# Patient Record
Sex: MALE | Race: AMERICAN INDIAN OR ALASKA NATIVE
[De-identification: names, ages, dates, MRNs, and addresses within clinical notes are randomized per-mention and may not be internally consistent; named-entity substitution may affect disease eponyms.]

---

## 2019-03-12 ENCOUNTER — HOSPITAL ENCOUNTER (EMERGENCY)
Dept: HOSPITAL 5 - ED | Age: 54
Discharge: HOME | End: 2019-03-12
Payer: COMMERCIAL

## 2019-03-12 VITALS — SYSTOLIC BLOOD PRESSURE: 184 MMHG | DIASTOLIC BLOOD PRESSURE: 92 MMHG

## 2019-03-12 DIAGNOSIS — J45.909: ICD-10-CM

## 2019-03-12 DIAGNOSIS — Z91.19: ICD-10-CM

## 2019-03-12 DIAGNOSIS — I10: Primary | ICD-10-CM

## 2019-03-12 DIAGNOSIS — F17.200: ICD-10-CM

## 2019-03-12 PROCEDURE — 99282 EMERGENCY DEPT VISIT SF MDM: CPT

## 2019-03-12 NOTE — EMERGENCY DEPARTMENT REPORT
ED General Adult HPI





- General


Chief complaint: High BP


Stated complaint: POSS HIGH BP


Time Seen by Provider: 03/12/19 05:36


Source: patient, old records reviewed


Mode of arrival: Ambulatory


Limitations: No Limitations





- History of Present Illness


Initial comments: 





53-year-old -American male with a past medical history of attention 

asthma and a TIA back in 02/17/2017.  Patient reports that he checked his blood 

pressure at home and it was 170 at home.  Patient admits to a headache that is 

dull.  Patient reports that he was prescribed lisinopril 2 years ago has been 

out of his meds never followed up.  Patient reports he does not have a primary 

care provider.  Patient denies any chest pain or shortness of breathing.


-: days(s) (1)


Severity scale (0 -10): 10


Quality: aching, dull


Consistency: constant


Improves with: none


Treatments Prior to Arrival: none





- Related Data


                                  Previous Rx's











 Medication  Instructions  Recorded  Last Taken  Type


 


Albuterol  *Only Ed* [Proventil 2.5 mg IH Q4H PRN #30 vial 01/19/16 Unknown Rx





0.5% NEBS]    


 


Lisinopril [Zestril TAB] 20 mg PO QDAY #30 tablet 01/19/16 Unknown Rx


 


Pantoprazole [Protonix] 40 mg PO QDAY #30 tablet 01/19/16 Unknown Rx


 


Albuterol Sulfate [Albuterol 0.63% 0.63 mg IH TID PRN #1 box 02/17/17 Unknown Rx





NEBS]    


 


Aspirin [Aspirin TAB] 325 mg PO QDAY #30 tablet 02/17/17 Unknown Rx


 


Ipratropium [Atrovent NEB] 0.5 mg IH Q6HRT #1 box 02/17/17 Unknown Rx


 


Simvastatin [Zocor TAB] 5 mg PO QHS #30 tablet 02/17/17 Unknown Rx


 


amLODIPine [Norvasc] 10 mg PO DAILY #30 tab 03/12/19 Unknown Rx











                                    Allergies











Allergy/AdvReac Type Severity Reaction Status Date / Time


 


No Known Allergies Allergy   Verified 10/17/15 22:52














ED Review of Systems


ROS: 


Stated complaint: POSS HIGH BP


Other details as noted in HPI





Comment: All other systems reviewed and negative


Respiratory: wheezing


Neurological: headache





ED Past Medical Hx





- Past Medical History


Previous Medical History?: Yes


Hx Hypertension: Yes


Hx Asthma: Yes


Additional medical history: Ulcers





- Surgical History


Past Surgical History?: Yes


Additional Surgical History: RIGHT EYE SURGERY





- Social History


Smoking Status: Current Every Day Smoker


Substance Use Type: Alcohol





- Medications


Home Medications: 


                                Home Medications











 Medication  Instructions  Recorded  Confirmed  Last Taken  Type


 


Albuterol  *Only Ed* [Proventil 2.5 mg IH Q4H PRN #30 vial 01/19/16  Unknown Rx





0.5% NEBS]     


 


Lisinopril [Zestril TAB] 20 mg PO QDAY #30 tablet 01/19/16  Unknown Rx


 


Pantoprazole [Protonix] 40 mg PO QDAY #30 tablet 01/19/16  Unknown Rx


 


Albuterol Sulfate [Albuterol 0.63% 0.63 mg IH TID PRN #1 box 02/17/17  Unknown 

Rx





NEBS]     


 


Aspirin [Aspirin TAB] 325 mg PO QDAY #30 tablet 02/17/17  Unknown Rx


 


Ipratropium [Atrovent NEB] 0.5 mg IH Q6HRT #1 box 02/17/17  Unknown Rx


 


Simvastatin [Zocor TAB] 5 mg PO QHS #30 tablet 02/17/17  Unknown Rx


 


amLODIPine [Norvasc] 10 mg PO DAILY #30 tab 03/12/19  Unknown Rx














ED Physical Exam





- General


Limitations: No Limitations


General appearance: alert





- Head


Head exam: Present: atraumatic, normocephalic





- Eye


Eye exam: Present: EOMI





- ENT


ENT exam: Present: mucous membranes moist





- Neck


Neck exam: Present: normal inspection, full ROM





- Respiratory


Respiratory exam: Present: wheezes





- Cardiovascular


Cardiovascular Exam: Present: regular rate, normal rhythm.  Absent: systolic 

murmur, diastolic murmur, rubs, gallop





- GI/Abdominal


GI/Abdominal exam: Present: soft, normal bowel sounds





- Neurological Exam


Neurological exam: Present: alert, oriented X3, normal gait





- Expanded Neurological Exam


  ** Expanded


Cranial nerves: EOM's Intact: Normal, Gag Reflex: Normal, Tongue Deviation: 

Normal, Nystagmus: Normal, Facial Sensation: Normal, Facial Palsy with Forehead 

Movement: Normal, Facial Palsy without Forehead Movement: Normal


Cerebellar function: Finger to Nose: Normal, Heel to Shin: Normal


Upper motor neuron: Krishan Neglect: Normal, Pronator Drift: Normal


Motor strength exam: RUE: 4, LUE: 4, RLE: 4, LLE: 4


Best Eye Response (Alexandr): (4) open spontaneously


Best Motor Response ([place]): (6) obeys commands


Best Verbal Response (Alexandr): (5) oriented


Alexandr Total: 15





- Psychiatric


Psychiatric exam: Present: normal affect, normal mood





- Skin


Skin exam: Present: warm, dry, intact, normal color.  Absent: rash





ED Course


                                   Vital Signs











  03/12/19





  04:51


 


Temperature 97.7 F


 


Pulse Rate 62


 


Respiratory 18





Rate 


 


Blood Pressure 199/100


 


O2 Sat by Pulse 100





Oximetry 














ED Medical Decision Making





- Medical Decision Making





Patient has been evaluated by this provider at [facility].


Patient was given clonidine 0.1 mg for blood pressure of 199/100.


Repeat of blood pressure after having clonidine for 40 minutes is 184/92 heart 

rate 54.


It was noted on examination the patient has some wheezing.  Patient declined to 

have a breathing treatment states that he will get one at home.  Patient reports

 that many people at his home has asthma and they have medication that he can 

use.


Patient will be discharged home on amlodipine 10 mg daily discussed with patient

 that this prescription is free at [facility] patient will be given a good Rx cart.


Discussed with patient to follow up at [facility] to have his 

blood pressure rechecked.  Patient verbalized understanding


Critical care attestation.: 


If time is entered above; I have spent that time in minutes in the direct care 

of this critically ill patient, excluding procedure time.








ED Disposition


Clinical Impression: 


 Non-compliance, Wheezing





HTN (hypertension)


Qualifiers:


 Hypertension type: unspecified Qualified Code(s): I10 - Essential (primary) 

hypertension





Disposition: DC-01 TO HOME OR SELFCARE


Is pt being admited?: No


Does the pt Need Aspirin: No


Condition: Stable


Instructions:  Hypertension (ED)


Additional Instructions: 


Please take your blood pressure medication daily as prescribed.  I have referred

 to [facility] clinic to follow up for your blood pressure management.  

Their information is listed below for your convenience.  Be aware that you're 

blood pressure medication I have prescribed to you is free at [facility].  I have 

also given you a good [employer] discount insurance card to help with your 

prescription refill.


Prescriptions: 


amLODIPine [Norvasc] 10 mg PO DAILY #30 tab


Referrals: 


[facility] [place] [Primary Care Provider] - 3-5 Days

## 2019-06-13 ENCOUNTER — HOSPITAL ENCOUNTER (EMERGENCY)
Dept: HOSPITAL 5 - ED | Age: 54
Discharge: HOME | End: 2019-06-13
Payer: SELF-PAY

## 2019-06-13 VITALS — SYSTOLIC BLOOD PRESSURE: 171 MMHG | DIASTOLIC BLOOD PRESSURE: 105 MMHG

## 2019-06-13 DIAGNOSIS — Z79.899: ICD-10-CM

## 2019-06-13 DIAGNOSIS — Z79.82: ICD-10-CM

## 2019-06-13 DIAGNOSIS — I10: ICD-10-CM

## 2019-06-13 DIAGNOSIS — J45.909: ICD-10-CM

## 2019-06-13 DIAGNOSIS — M25.562: Primary | ICD-10-CM

## 2019-06-13 DIAGNOSIS — F17.200: ICD-10-CM

## 2019-06-13 LAB
ALBUMIN SERPL-MCNC: 4.3 G/DL (ref 3.9–5)
ALT SERPL-CCNC: 11 UNITS/L (ref 7–56)
BASOPHILS # (AUTO): 0.1 K/MM3 (ref 0–0.1)
BASOPHILS NFR BLD AUTO: 0.9 % (ref 0–1.8)
BUN SERPL-MCNC: 20 MG/DL (ref 9–20)
BUN/CREAT SERPL: 14 %
CALCIUM SERPL-MCNC: 9.1 MG/DL (ref 8.4–10.2)
EOSINOPHIL # BLD AUTO: 0.3 K/MM3 (ref 0–0.4)
EOSINOPHIL NFR BLD AUTO: 5.3 % (ref 0–4.3)
HCT VFR BLD CALC: 41 % (ref 35.5–45.6)
HEMOLYSIS INDEX: 4
HGB BLD-MCNC: 14 GM/DL (ref 11.8–15.2)
LYMPHOCYTES # BLD AUTO: 2.4 K/MM3 (ref 1.2–5.4)
LYMPHOCYTES NFR BLD AUTO: 36.5 % (ref 13.4–35)
MCHC RBC AUTO-ENTMCNC: 34 % (ref 32–34)
MCV RBC AUTO: 90 FL (ref 84–94)
MONOCYTES # (AUTO): 0.5 K/MM3 (ref 0–0.8)
MONOCYTES % (AUTO): 7.8 % (ref 0–7.3)
PLATELET # BLD: 221 K/MM3 (ref 140–440)
RBC # BLD AUTO: 4.56 M/MM3 (ref 3.65–5.03)

## 2019-06-13 PROCEDURE — 96372 THER/PROPH/DIAG INJ SC/IM: CPT

## 2019-06-13 PROCEDURE — 80053 COMPREHEN METABOLIC PANEL: CPT

## 2019-06-13 PROCEDURE — 36415 COLL VENOUS BLD VENIPUNCTURE: CPT

## 2019-06-13 PROCEDURE — 73562 X-RAY EXAM OF KNEE 3: CPT

## 2019-06-13 PROCEDURE — 85025 COMPLETE CBC W/AUTO DIFF WBC: CPT

## 2019-06-13 PROCEDURE — 99284 EMERGENCY DEPT VISIT MOD MDM: CPT

## 2019-06-13 NOTE — EMERGENCY DEPARTMENT REPORT
ED Extremity Problem HPI





- General


Chief complaint: Extremity Injury, Lower


Stated complaint: L KNEE PAIN


Time Seen by Provider: 06/13/19 18:32


Source: patient


Mode of arrival: Ambulatory


Limitations: No Limitations





- History of Present Illness


Initial comments: 





Patient is a 54-year-old male who presents to ED complaining of left knee pain 

2 days.  Patient states he has a history of arthritis and has been doing a lot 

of walking.  Patient states that pain is localized to his anterior and laterally

he.  Patient denies any swelling, injury, falls or trauma to the knee.


MD Complaint: extremity pain, joint paint


Severity scale (0 -10): 6





- Related Data


                                  Previous Rx's











 Medication  Instructions  Recorded  Last Taken  Type


 


Albuterol  *Only Ed* [Proventil 2.5 mg IH Q4H PRN #30 vial 01/19/16 Unknown Rx





0.5% NEBS]    


 


Lisinopril [Zestril TAB] 20 mg PO QDAY #30 tablet 01/19/16 Unknown Rx


 


Pantoprazole [Protonix] 40 mg PO QDAY #30 tablet 01/19/16 Unknown Rx


 


Albuterol Sulfate [Albuterol 0.63% 0.63 mg IH TID PRN #1 box 02/17/17 Unknown Rx





NEBS]    


 


Aspirin 325 mg PO QDAY #30 tablet 02/17/17 Unknown Rx


 


Ipratropium [Atrovent NEB] 0.5 mg IH Q6HRT #1 box 02/17/17 Unknown Rx


 


Simvastatin [Zocor TAB] 5 mg PO QHS #30 tablet 02/17/17 Unknown Rx


 


Cyclobenzaprine [Flexeril] 10 mg PO QHS PRN #10 tablet 06/13/19 Unknown Rx


 


Naproxen [Naprosyn] 500 mg PO BID #30 tablet 06/13/19 Unknown Rx


 


amLODIPine [Norvasc] 10 mg PO DAILY #30 tab 06/13/19 Unknown Rx











                                    Allergies











Allergy/AdvReac Type Severity Reaction Status Date / Time


 


No Known Allergies Allergy   Verified 06/13/19 18:26














ED Review of Systems


ROS: 


Stated complaint: L KNEE PAIN


Other details as noted in HPI





Comment: All other systems reviewed and negative





ED Past Medical Hx





- Past Medical History


Previous Medical History?: Yes


Hx Hypertension: Yes


Hx Asthma: Yes


Additional medical history: Ulcers





- Surgical History


Past Surgical History?: Yes


Additional Surgical History: RIGHT EYE SURGERY





- Social History


Smoking Status: Current Every Day Smoker


Substance Use Type: None





- Medications


Home Medications: 


                                Home Medications











 Medication  Instructions  Recorded  Confirmed  Last Taken  Type


 


Albuterol  *Only Ed* [Proventil 2.5 mg IH Q4H PRN #30 vial 01/19/16  Unknown Rx





0.5% NEBS]     


 


Lisinopril [Zestril TAB] 20 mg PO QDAY #30 tablet 01/19/16  Unknown Rx


 


Pantoprazole [Protonix] 40 mg PO QDAY #30 tablet 01/19/16  Unknown Rx


 


Albuterol Sulfate [Albuterol 0.63% 0.63 mg IH TID PRN #1 box 02/17/17  Unknown 

Rx





NEBS]     


 


Aspirin 325 mg PO QDAY #30 tablet 02/17/17  Unknown Rx


 


Ipratropium [Atrovent NEB] 0.5 mg IH Q6HRT #1 box 02/17/17  Unknown Rx


 


Simvastatin [Zocor TAB] 5 mg PO QHS #30 tablet 02/17/17  Unknown Rx


 


Cyclobenzaprine [Flexeril] 10 mg PO QHS PRN #10 tablet 06/13/19  Unknown Rx


 


Naproxen [Naprosyn] 500 mg PO BID #30 tablet 06/13/19  Unknown Rx


 


amLODIPine [Norvasc] 10 mg PO DAILY #30 tab 06/13/19  Unknown Rx














ED Physical Exam





- General


Limitations: No Limitations


General appearance: alert, in no apparent distress





- Head


Head exam: Present: atraumatic, normocephalic





- Eye


Eye exam: Present: normal appearance





- ENT


ENT exam: Present: mucous membranes moist





- Neck


Neck exam: Present: normal inspection





- Respiratory


Respiratory exam: Present: normal lung sounds bilaterally.  Absent: respiratory 

distress





- Cardiovascular


Cardiovascular Exam: Present: regular rate, normal rhythm.  Absent: systolic 

murmur, diastolic murmur, rubs, gallop





- GI/Abdominal


GI/Abdominal exam: Present: soft, normal bowel sounds





- Rectal


Rectal exam: Present: deferred





- Extremities Exam


Extremities exam: Present: normal inspection, full ROM.  Absent: joint swelling,

 calf tenderness





- Expanded Lower Extremity Exam


  ** Left


Hip exam: Present: normal inspection, full ROM


Upper Leg exam: Present: normal inspection, full ROM


Knee exam: Present: normal inspection, full ROM.  Absent: tenderness, swelling, 

abrasion, deformity, dislocation, erythema, effusion


Lower Leg exam: Present: normal inspection


Ankle exam: Present: normal inspection


Foot/Toe exam: Present: normal inspection, full ROM


Neuro vascular tendon exam: Present: no vascular compromise


Gait: Positive: observed and normal





- Back Exam


Back exam: Present: normal inspection, full ROM.  Absent: tenderness





- Neurological Exam


Neurological exam: Present: alert, oriented X3





- Psychiatric


Psychiatric exam: Present: normal affect, normal mood





- Skin


Skin exam: Present: warm, dry, intact, normal color.  Absent: rash





ED Course


                                   Vital Signs











  06/13/19 06/13/19 06/13/19





  18:34 20:46 22:36


 


Temperature 98.3 F  97.9 F


 


Pulse Rate 72  67


 


Respiratory 18  16





Rate   


 


Blood Pressure 208/115 226/106 


 


Blood Pressure   178/108





[Left]   


 


O2 Sat by Pulse 99  100





Oximetry   














  06/13/19





  23:35


 


Temperature 


 


Pulse Rate 57 L


 


Respiratory 17





Rate 


 


Blood Pressure 


 


Blood Pressure 171/105





[Left] 


 


O2 Sat by Pulse 99





Oximetry 














ED Medical Decision Making





- Lab Data


Result diagrams: 


                                 06/13/19 20:06





                                 06/13/19 20:06





- Radiology Data


Radiology results: report reviewed, image reviewed


Fluoro Time In Minutes: 





PROCEDURE: XR KNEE 3V LT 





TECHNIQUE: Left knee, 3 views 





HISTORY: knee pain 





COMPARISONS: None 





FINDINGS: 





No acute fracture or dislocation is seen. There are tricompartmental 

osteoarthritic changes, with 


joint space narrowing and osteophyte formation. There is an intra-articular body

 seen in the 


posterior medial joint space, measuring up to 17 mm. 2 smaller anterior intra-

articular body is 


seen, measuring 8 mm and 4 mm. 





IMPRESSION: 





No fracture or dislocation. Osteoarthritis. 





This document is electronically signed by Magali Torre MD., June 13 2019 

11:06:34 PM ET 





Transcribed By: [facility] 


Dictated By: MAGALI TORRE M.D. 


Electronically Authenticated By: MAGALI TORRE M.D. 


Signed Date/Time: 06/13/19 2157 











- Medical Decision Making


This 54-year-old male presents to the ED complaining of left knee joint pain.


Patient seen prednisone Toradol and ED.


Discussed with the patient to take medication as prescribed.


X-ray of the knee shows no acute deformity, see report above


Discussed findings with the patient.


Discussed follow-up with primary care physician.  Patient is able to ambulate 

without any problems.


Clonidine given in ED to reduce blood pressure.  Discussed follow-up with 

primary care physician for blood pressure management.  


Amlodipine given at discharge.





Critical care attestation.: 


If time is entered above; I have spent that time in minutes in the direct care 

of this critically ill patient, excluding procedure time.








ED Disposition


Clinical Impression: 


 Arthralgia of left knee, Knee pain, left, Uncontrolled hypertension





Disposition: DC-01 TO HOME OR SELFCARE


Is pt being admited?: No


Does the pt Need Aspirin: No


Condition: Stable


Instructions:  Osteoarthritis (ED), Hypertension (ED), Arthralgia (ED)


Additional Instructions: 


Make sure to follow up with the primary care physician as discussed.


Take all your medications as you've been prescribed.


If you have any worsening symptoms or develop new symptoms please return to ED 

immediately.


Prescriptions: 


Cyclobenzaprine [Flexeril] 10 mg PO QHS PRN #10 tablet


 PRN Reason: Muscle Spasm


Naproxen [Naprosyn] 500 mg PO BID #30 tablet


amLODIPine [Norvasc] 10 mg PO DAILY #30 tab


Referrals: 


[facility] [Primary Care Provider] - 3-5 Days


[facility] [Outside] - 3-5 Days


[facility] [Outside] - 3-5 Days


Forms:  Work/School Release Form(ED)


Time of Disposition: 22:45

## 2019-06-13 NOTE — XRAY REPORT
PROCEDURE: XR KNEE 3V LT 

 

TECHNIQUE:  Left knee, 3 views 

 

HISTORY: knee pain 

 

COMPARISONS: None  

 

FINDINGS: 

 

No acute fracture or dislocation is seen. There are tricompartmental osteoarthritic changes, with gabby
nt space narrowing and osteophyte formation. There is an intra-articular body seen in the posterior m
edial joint space, measuring up to 17 mm. 2 smaller anterior intra-articular body is seen, measuring 
8 mm and 4 mm. 

 

IMPRESSION:  

 

No fracture or dislocation. Osteoarthritis. 

 

This document is electronically signed by Magali Torre MD., June 13 2019 11:06:34 PM ET

## 2019-11-10 ENCOUNTER — HOSPITAL ENCOUNTER (EMERGENCY)
Dept: HOSPITAL 5 - ED | Age: 54
Discharge: HOME | End: 2019-11-10
Payer: SELF-PAY

## 2019-11-10 VITALS — DIASTOLIC BLOOD PRESSURE: 99 MMHG | SYSTOLIC BLOOD PRESSURE: 152 MMHG

## 2019-11-10 DIAGNOSIS — J45.909: ICD-10-CM
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
## 2019-11-10 DIAGNOSIS — S39.012A: Primary | ICD-10-CM
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
## 2019-11-10 DIAGNOSIS — I10: ICD-10-CM

## 2019-11-10 DIAGNOSIS — Z79.899: ICD-10-CM

## 2019-11-10 DIAGNOSIS — G89.29: ICD-10-CM
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
## 2019-11-10 DIAGNOSIS — Z79.82: ICD-10-CM
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
## 2019-11-10 PROCEDURE — 99282 EMERGENCY DEPT VISIT SF MDM: CPT

## 2019-11-10 PROCEDURE — 96372 THER/PROPH/DIAG INJ SC/IM: CPT

## 2019-11-10 NOTE — EMERGENCY DEPARTMENT REPORT
ED Back Pain/Injury HPI





- General


Chief Complaint: Back Pain/Injury


Stated Complaint: BACK PAIN


Time Seen by Provider: 11/10/19 11:27


Source: patient


Limitations: No Limitations





- History of Present Illness


Initial Comments: 





This is a 54-year-old male nontoxic, well nourished in appearance, no acute 

signs of distress presents to the ED with c/o of acute on chronic lower back pa

in.  Patient stated that the past 4 days he wake up and developed sharp pains.  

Patient denies any radiation of pain.  Patient denies any trauma.  Denies any 

bladder or bowel instability.  Patient denies any urinary symptoms.  Denies any 

fever, chills, nausea, vomiting, headache, stiff neck, chest pain or shortness 

of breath.  Patient denies any numbness or tingling.  Denies any allergies.  


MD Complaint: back pain


-: days(s) (4)


Similar Symptoms Previously: Yes


Place: home


Radiation: none


Severity: mild


Severity scale (0 -10): 8


Quality: aching


Consistency: intermittent


Improves With: immobilization, sitting upright


Worsens With: movement, walking


Associated Symptoms: denies other symptoms.  denies: confusion, weakness, chest 

pain, numbness, difficulty walking, cough, difficulty urinating, diaphoresis, 

incontinence, fever/chills, constipation, headaches, abdominal pain, loss of 

appetite, malaise, nausea/vomiting, rash, seizure, shortness of breath, syncope





- Related Data


                                  Previous Rx's











 Medication  Instructions  Recorded  Last Taken  Type


 


Albuterol  *Only Ed* [Proventil 2.5 mg IH Q4H PRN #30 vial 01/19/16 Unknown Rx





0.5% NEBS]    


 


Lisinopril [Zestril TAB] 20 mg PO QDAY #30 tablet 01/19/16 Unknown Rx


 


Pantoprazole [Protonix] 40 mg PO QDAY #30 tablet 01/19/16 Unknown Rx


 


Albuterol Sulfate [Albuterol 0.63% 0.63 mg IH TID PRN #1 box 02/17/17 Unknown Rx





NEBS]    


 


Aspirin 325 mg PO QDAY #30 tablet 02/17/17 Unknown Rx


 


Ipratropium [Atrovent NEB] 0.5 mg IH Q6HRT #1 box 02/17/17 Unknown Rx


 


Simvastatin [Zocor TAB] 5 mg PO QHS #30 tablet 02/17/17 Unknown Rx


 


Cyclobenzaprine [Flexeril] 10 mg PO QHS PRN #10 tablet 06/13/19 Unknown Rx


 


Naproxen [Naprosyn] 500 mg PO BID #30 tablet 06/13/19 Unknown Rx


 


amLODIPine 10 mg PO DAILY #30 tab 06/13/19 Unknown Rx


 


Cyclobenzaprine [Flexeril] 10 mg PO QHS PRN #10 tablet 11/10/19 Unknown Rx


 


Naproxen 500 mg PO Q12H PRN #10 tablet 11/10/19 Unknown Rx











                                    Allergies











Allergy/AdvReac Type Severity Reaction Status Date / Time


 


No Known Allergies Allergy   Verified 06/13/19 18:26














ED Review of Systems


ROS: 


Stated complaint: BACK PAIN


Other details as noted in HPI





Constitutional: denies: chills, fever


Eyes: denies: eye pain, eye discharge, vision change


ENT: denies: ear pain, throat pain


Respiratory: denies: cough, shortness of breath, wheezing


Cardiovascular: denies: chest pain, palpitations


Endocrine: no symptoms reported


Gastrointestinal: denies: abdominal pain, nausea, diarrhea


Genitourinary: denies: urgency, dysuria


Musculoskeletal: back pain.  denies: joint swelling, arthralgia


Skin: denies: rash, lesions


Neurological: denies: headache, weakness, paresthesias


Psychiatric: denies: anxiety, depression


Hematological/Lymphatic: denies: easy bleeding, easy bruising





ED Past Medical Hx





- Past Medical History


Hx Hypertension: Yes


Hx Asthma: Yes


Additional medical history: Ulcers





- Surgical History


Additional Surgical History: RIGHT EYE SURGERY





- Social History


Smoking Status: Never Smoker


Substance Use Type: None





- Medications


Home Medications: 


                                Home Medications











 Medication  Instructions  Recorded  Confirmed  Last Taken  Type


 


Albuterol  *Only Ed* [Proventil 2.5 mg IH Q4H PRN #30 vial 01/19/16  Unknown Rx





0.5% NEBS]     


 


Lisinopril [Zestril TAB] 20 mg PO QDAY #30 tablet 01/19/16  Unknown Rx


 


Pantoprazole [Protonix] 40 mg PO QDAY #30 tablet 01/19/16  Unknown Rx


 


Albuterol Sulfate [Albuterol 0.63% 0.63 mg IH TID PRN #1 box 02/17/17  Unknown 

Rx





NEBS]     


 


Aspirin 325 mg PO QDAY #30 tablet 02/17/17  Unknown Rx


 


Ipratropium [Atrovent NEB] 0.5 mg IH Q6HRT #1 box 02/17/17  Unknown Rx


 


Simvastatin [Zocor TAB] 5 mg PO QHS #30 tablet 02/17/17  Unknown Rx


 


Cyclobenzaprine [Flexeril] 10 mg PO QHS PRN #10 tablet 06/13/19  Unknown Rx


 


Naproxen [Naprosyn] 500 mg PO BID #30 tablet 06/13/19  Unknown Rx


 


amLODIPine 10 mg PO DAILY #30 tab 06/13/19  Unknown Rx


 


Cyclobenzaprine [Flexeril] 10 mg PO QHS PRN #10 tablet 11/10/19  Unknown Rx


 


Naproxen 500 mg PO Q12H PRN #10 tablet 11/10/19  Unknown Rx














ED Physical Exam





- General


Limitations: No Limitations


General appearance: alert, in no apparent distress





- Head


Head exam: Present: atraumatic, normocephalic





- Neck


Neck exam: Present: normal inspection, full ROM.  Absent: tenderness, 

meningismus, lymphadenopathy





- Extremities Exam


Extremities exam: Present: normal inspection, full ROM





- Back Exam


Back exam: Present: normal inspection, full ROM, paraspinal tenderness (lumbar 

paraspinal).  Absent: tenderness, CVA tenderness (R), CVA tenderness (L), muscle

 spasm, vertebral tenderness, rash noted





- Expanded Back Exam


  ** Expanded


Back exam: Absent: saddle anesthesia


Back exam: Negative Straight Leg Raising: Left, Right





- Neurological Exam


Neurological exam: Present: alert, oriented X3, normal gait





- Psychiatric


Psychiatric exam: Present: normal affect, normal mood





- Skin


Skin exam: Present: warm, dry, intact, normal color.  Absent: rash





ED Course





                                   Vital Signs











  11/10/19





  10:57


 


Temperature 97.7 F


 


Pulse Rate 75


 


Respiratory 18





Rate 


 


Blood Pressure 152/99


 


O2 Sat by Pulse 98





Oximetry 














- Reevaluation(s)


Reevaluation #1: 





11/10/19 11:55


Patient is speaking in full sentences with no signs of distress noted.





ED Medical Decision Making





- Medical Decision Making





This is a 54-year-old male that presents with low back strain.  Patient is 

stable was examined by me.  There is no spinal tenderness.  There is no cauda 

equina syndrome during examination.  No bladder or bowel instability. Patient 

received Toradol 60 mg IM and prednisone in the ED which he stated that his 

symptoms has resolved and subsided.  Patient is discharged with muscle relaxant 

and Motrin.  Patient was instructed not to operate any machinery while taking 

muscle relaxant as they cause her drowsiness.  Patient was referred to Follow-up

 with a primary care doctor in 3-5 days or if symptoms worsen and continue 

return to emergency room as soon as possible.  At time of discharge, the patient

 does not seem toxic or ill in appearance.  No acute signs of distress noted.  

Patient agrees to discharge treatment plan of care.  No further questions noted 

by the patient.





This chart is dictated with using Dragon Dictation Program


Critical care attestation.: 


If time is entered above; I have spent that time in minutes in the direct care 

of this critically ill patient, excluding procedure time.








ED Disposition


Clinical Impression: 


Low back strain


Qualifiers:


 Encounter type: initial encounter Qualified Code(s): S39.012A - Strain of 

muscle, fascia and tendon of lower back, initial encounter





Disposition: DC-01 TO HOME OR SELFCARE


Is pt being admited?: No


Does the pt Need Aspirin: No


Condition: Stable


Instructions:  Low Back Strain (ED), Cyclobenzaprine (By mouth)


Additional Instructions: 


Follow-up with your primary care doctor in 3-5 days or if symptoms worsen such 

as bladder or bowel stability, chest pain, short of breath, numbness or tingling

 sensation in extremities, headache, dizziness, visual changes, nausea vomiting,

 or abdominal pain, return back to emergency room as was possible.


Take ibuprofen and Flexeril as prescribed.  Do not operate heavy machinery while

 taking Flexeril due to sedation


Prescriptions: 


Cyclobenzaprine [Flexeril] 10 mg PO QHS PRN #10 tablet


 PRN Reason: Muscle Spasm


Naproxen 500 mg PO Q12H PRN #10 tablet


 PRN Reason: Pain , Severe (7-10)


Referrals: 


PRIMARY CARE,MD [Referring] - 3-5 Days


VARSHA BARRON MD [Staff Physician] - 3-5 Days


[facility] [Outside] - 3-5 Days


[facility] [Outside] - 3-5 Days


Forms:  Work/School Release Form(ED)

## 2020-01-13 ENCOUNTER — HOSPITAL ENCOUNTER (EMERGENCY)
Dept: HOSPITAL 5 - ED | Age: 55
Discharge: LEFT BEFORE DISCHARGE | End: 2020-01-13
Payer: SELF-PAY

## 2020-01-13 DIAGNOSIS — Z53.21: ICD-10-CM

## 2020-01-13 DIAGNOSIS — R10.13: Primary | ICD-10-CM

## 2020-01-20 ENCOUNTER — HOSPITAL ENCOUNTER (EMERGENCY)
Dept: HOSPITAL 5 - ED | Age: 55
Discharge: HOME | End: 2020-01-20
Payer: SELF-PAY

## 2020-01-20 VITALS — SYSTOLIC BLOOD PRESSURE: 146 MMHG | DIASTOLIC BLOOD PRESSURE: 96 MMHG

## 2020-01-20 DIAGNOSIS — R19.7: ICD-10-CM

## 2020-01-20 DIAGNOSIS — I10: ICD-10-CM

## 2020-01-20 DIAGNOSIS — K21.9: ICD-10-CM

## 2020-01-20 DIAGNOSIS — Z98.890: ICD-10-CM

## 2020-01-20 DIAGNOSIS — R10.13: Primary | ICD-10-CM

## 2020-01-20 DIAGNOSIS — F17.200: ICD-10-CM

## 2020-01-20 DIAGNOSIS — J45.909: ICD-10-CM

## 2020-01-20 DIAGNOSIS — Z79.899: ICD-10-CM

## 2020-01-20 LAB
ALBUMIN SERPL-MCNC: 4.7 G/DL (ref 3.9–5)
ALT SERPL-CCNC: 17 UNITS/L (ref 7–56)
BASOPHILS # (AUTO): 0.1 K/MM3 (ref 0–0.1)
BASOPHILS NFR BLD AUTO: 1.3 % (ref 0–1.8)
BILIRUB UR QL STRIP: (no result)
BLOOD UR QL VISUAL: (no result)
BUN SERPL-MCNC: 20 MG/DL (ref 9–20)
BUN/CREAT SERPL: 15 %
CALCIUM SERPL-MCNC: 9.8 MG/DL (ref 8.4–10.2)
EOSINOPHIL # BLD AUTO: 0.5 K/MM3 (ref 0–0.4)
EOSINOPHIL NFR BLD AUTO: 7.1 % (ref 0–4.3)
HCT VFR BLD CALC: 46.5 % (ref 35.5–45.6)
HEMOLYSIS INDEX: 9
HGB BLD-MCNC: 15.7 GM/DL (ref 11.8–15.2)
LYMPHOCYTES # BLD AUTO: 2.3 K/MM3 (ref 1.2–5.4)
LYMPHOCYTES NFR BLD AUTO: 32 % (ref 13.4–35)
MCHC RBC AUTO-ENTMCNC: 34 % (ref 32–34)
MCV RBC AUTO: 89 FL (ref 84–94)
MONOCYTES # (AUTO): 0.6 K/MM3 (ref 0–0.8)
MONOCYTES % (AUTO): 8.5 % (ref 0–7.3)
MUCOUS THREADS #/AREA URNS HPF: (no result) /HPF
PH UR STRIP: 6 [PH] (ref 5–7)
PLATELET # BLD: 286 K/MM3 (ref 140–440)
RBC # BLD AUTO: 5.25 M/MM3 (ref 3.65–5.03)
RBC #/AREA URNS HPF: 3 /HPF (ref 0–6)
UROBILINOGEN UR-MCNC: < 2 MG/DL (ref ?–2)
WBC #/AREA URNS HPF: 1 /HPF (ref 0–6)

## 2020-01-20 PROCEDURE — 36415 COLL VENOUS BLD VENIPUNCTURE: CPT

## 2020-01-20 PROCEDURE — 81001 URINALYSIS AUTO W/SCOPE: CPT

## 2020-01-20 PROCEDURE — 85025 COMPLETE CBC W/AUTO DIFF WBC: CPT

## 2020-01-20 PROCEDURE — 80053 COMPREHEN METABOLIC PANEL: CPT

## 2020-01-20 PROCEDURE — 83690 ASSAY OF LIPASE: CPT

## 2020-01-20 NOTE — EMERGENCY DEPARTMENT REPORT
<BRIDGET ALEJANDRO - Last Filed: 01/20/20 09:12>





ED Abdominal Pain HPI





- General


Chief Complaint: Abdominal Pain


Stated Complaint: ABD PAIN/DIARRHEA


Time Seen by Provider: 01/20/20 06:51


Source: patient


Mode of arrival: Ambulatory


Limitations: No Limitations





- History of Present Illness


Initial Comments: 





Visit pleasant 54-year-old male presents the emergency department with chief 

complaint a burning epigastric pain with associated diarrhea over the past week.

 Patient states the diarrhea started 2 days ago.  He reports a past medical 

history of stomach ulcers states this feels similar.  He also has a past medical

history of asthma and hypertension.  He rates the severity of his pain as a 4 

out of 10.  The pain does not radiate.  He denies any sick contacts.  He denies 

any recent travel.  His any recent antibiotic use.  He denies any associated 

chest pain, shortness of breath, exertional pain, fever, chills, night sweats, 

melena, hematochezia, nausea, vomiting, headache, dizziness, blurred vision or 

any other associated symptoms.





- Related Data


                                  Previous Rx's











 Medication  Instructions  Recorded  Last Taken  Type


 


Albuterol  *Only Ed* [Proventil 2.5 mg IH Q4H PRN #30 vial 01/19/16 Unknown Rx





0.5% NEBS]    


 


Pantoprazole [Protonix] 40 mg PO QDAY #30 tablet 01/19/16 Unknown Rx


 


lisinopriL [Zestril TAB] 20 mg PO QDAY #30 tablet 01/19/16 Unknown Rx


 


Albuterol Sulfate [Albuterol 0.63% 0.63 mg IH TID PRN #1 box 02/17/17 Unknown Rx





NEBS]    


 


Aspirin 325 mg PO QDAY #30 tablet 02/17/17 Unknown Rx


 


Ipratropium [Atrovent NEB] 0.5 mg IH Q6HRT #1 box 02/17/17 Unknown Rx


 


Simvastatin [Zocor TAB] 5 mg PO QHS #30 tablet 02/17/17 Unknown Rx


 


Cyclobenzaprine [Flexeril] 10 mg PO QHS PRN #10 tablet 06/13/19 Unknown Rx


 


Naproxen [Naprosyn] 500 mg PO BID #30 tablet 06/13/19 Unknown Rx


 


amLODIPine 10 mg PO DAILY #30 tab 06/13/19 Unknown Rx


 


Cyclobenzaprine [Flexeril] 10 mg PO QHS PRN #10 tablet 11/10/19 Unknown Rx


 


Naproxen 500 mg PO Q12H PRN #10 tablet 11/10/19 Unknown Rx


 


Omeprazole 20 mg PO DAILY #30 tablet. 01/20/20 Unknown Rx


 


Sucralfate [Carafate] 1 gm PO ACHS #90 tablet 01/20/20 Unknown Rx











                                    Allergies











Allergy/AdvReac Type Severity Reaction Status Date / Time


 


No Known Allergies Allergy   Verified 01/13/20 15:50














ED Review of Systems


Comment: All other systems reviewed and negative


Constitutional: denies: chills, fever


Eyes: denies: eye pain, eye discharge, vision change


ENT: denies: ear pain, throat pain


Respiratory: denies: cough, shortness of breath, wheezing


Cardiovascular: denies: chest pain, palpitations


Endocrine: no symptoms reported


Gastrointestinal: as per HPI, abdominal pain, diarrhea.  denies: nausea


Genitourinary: denies: urgency, dysuria


Musculoskeletal: denies: back pain, joint swelling, arthralgia


Skin: denies: rash, lesions


Neurological: denies: headache, weakness, paresthesias


Psychiatric: denies: anxiety, depression


Hematological/Lymphatic: denies: easy bleeding, easy bruising





ED Past Medical Hx





- Past Medical History


Previous Medical History?: Yes


Hx Hypertension: Yes


Hx GERD: Yes


Hx Asthma: Yes


Additional medical history: Ulcers





- Surgical History


Past Surgical History?: Yes


Additional Surgical History: RIGHT EYE SURGERY





- Social History


Smoking Status: Current Every Day Smoker


Substance Use Type: None





- Medications


Home Medications: 


                                Home Medications











 Medication  Instructions  Recorded  Confirmed  Last Taken  Type


 


Albuterol  *Only Ed* [Proventil 2.5 mg IH Q4H PRN #30 vial 01/19/16  Unknown Rx





0.5% NEBS]     


 


Pantoprazole [Protonix] 40 mg PO QDAY #30 tablet 01/19/16  Unknown Rx


 


lisinopriL [Zestril TAB] 20 mg PO QDAY #30 tablet 01/19/16  Unknown Rx


 


Albuterol Sulfate [Albuterol 0.63% 0.63 mg IH TID PRN #1 box 02/17/17  Unknown 

Rx





NEBS]     


 


Aspirin 325 mg PO QDAY #30 tablet 02/17/17  Unknown Rx


 


Ipratropium [Atrovent NEB] 0.5 mg IH Q6HRT #1 box 02/17/17  Unknown Rx


 


Simvastatin [Zocor TAB] 5 mg PO QHS #30 tablet 02/17/17  Unknown Rx


 


Cyclobenzaprine [Flexeril] 10 mg PO QHS PRN #10 tablet 06/13/19  Unknown Rx


 


Naproxen [Naprosyn] 500 mg PO BID #30 tablet 06/13/19  Unknown Rx


 


amLODIPine 10 mg PO DAILY #30 tab 06/13/19  Unknown Rx


 


Cyclobenzaprine [Flexeril] 10 mg PO QHS PRN #10 tablet 11/10/19  Unknown Rx


 


Naproxen 500 mg PO Q12H PRN #10 tablet 11/10/19  Unknown Rx


 


Omeprazole 20 mg PO DAILY #30 tablet. 01/20/20  Unknown Rx


 


Sucralfate [Carafate] 1 gm PO ACHS #90 tablet 01/20/20  Unknown Rx














ED Physical Exam





- General


Limitations: No Limitations


General appearance: alert, in no apparent distress





- Head


Head exam: Present: atraumatic, normocephalic





- Eye


Eye exam: Present: normal appearance, PERRL, EOMI


Pupils: Present: normal accommodation





- ENT


ENT exam: Present: normal exam, normal orophraynx, mucous membranes moist





- Neck


Neck exam: Present: normal inspection, full ROM.  Absent: tenderness, 

meningismus





- Respiratory


Respiratory exam: Present: normal lung sounds bilaterally.  Absent: respiratory 

distress, wheezes, rales, rhonchi, stridor





- Cardiovascular


Cardiovascular Exam: Present: regular rate, normal rhythm, normal heart sounds. 

 Absent: systolic murmur, diastolic murmur, rubs, gallop





- GI/Abdominal


GI/Abdominal exam: Present: soft, distended, tenderness (mild tenderness of the 

epigastrium, negative Moran sign, negative McBurney's point tenderness, normal 

bowel sounds throughout, tolerating by mouth fluids bedside.), normal bowel 

sounds.  Absent: guarding, rebound





- Rectal


Rectal exam: Present: deferred





- Extremities Exam


Extremities exam: Present: normal inspection





- Back Exam


Back exam: Present: normal inspection





- Neurological Exam


Neurological exam: Present: alert, oriented X3





- Psychiatric


Psychiatric exam: Present: normal affect, normal mood





- Skin


Skin exam: Present: warm, dry, intact, normal color.  Absent: rash





ED Medical Decision Making





- Lab Data


Result diagrams: 


                                 01/20/20 05:33





                                 01/20/20 05:33








                                   Lab Results











  01/20/20 01/20/20 01/20/20 Range/Units





  05:33 05:33 05:33 


 


WBC  7.1    (4.5-11.0)  K/mm3


 


RBC  5.25 H    (3.65-5.03)  M/mm3


 


Hgb  15.7 H    (11.8-15.2)  gm/dl


 


Hct  46.5 H    (35.5-45.6)  %


 


MCV  89    (84-94)  fl


 


MCH  30    (28-32)  pg


 


MCHC  34    (32-34)  %


 


RDW  13.1 L    (13.2-15.2)  %


 


Plt Count  286    (140-440)  K/mm3


 


Lymph % (Auto)  32.0    (13.4-35.0)  %


 


Mono % (Auto)  8.5 H    (0.0-7.3)  %


 


Eos % (Auto)  7.1 H    (0.0-4.3)  %


 


Baso % (Auto)  1.3    (0.0-1.8)  %


 


Lymph #  2.3    (1.2-5.4)  K/mm3


 


Mono #  0.6    (0.0-0.8)  K/mm3


 


Eos #  0.5 H    (0.0-0.4)  K/mm3


 


Baso #  0.1    (0.0-0.1)  K/mm3


 


Seg Neutrophils %  51.1    (40.0-70.0)  %


 


Seg Neutrophils #  3.6    (1.8-7.7)  K/mm3


 


Sodium   136 L   (137-145)  mmol/L


 


Potassium   4.5   (3.6-5.0)  mmol/L


 


Chloride   99.3   ()  mmol/L


 


Carbon Dioxide   25   (22-30)  mmol/L


 


Anion Gap   16   mmol/L


 


BUN   20   (9-20)  mg/dL


 


Creatinine   1.3   (0.8-1.5)  mg/dL


 


Estimated GFR   > 60   ml/min


 


BUN/Creatinine Ratio   15   %


 


Glucose   127 H   ()  mg/dL


 


Calcium   9.8   (8.4-10.2)  mg/dL


 


Total Bilirubin   0.40   (0.1-1.2)  mg/dL


 


AST   19   (5-40)  units/L


 


ALT   17   (7-56)  units/L


 


Alkaline Phosphatase   88   ()  units/L


 


Total Protein   8.4 H   (6.3-8.2)  g/dL


 


Albumin   4.7   (3.9-5)  g/dL


 


Albumin/Globulin Ratio   1.3   %


 


Lipase    34  (13-60)  units/L


 


Urine Color     (Yellow)  


 


Urine Turbidity     (Clear)  


 


Urine pH     (5.0-7.0)  


 


Ur Specific Gravity     (1.003-1.030)  


 


Urine Protein     (Negative)  mg/dL


 


Urine Glucose (UA)     (Negative)  mg/dL


 


Urine Ketones     (Negative)  mg/dL


 


Urine Blood     (Negative)  


 


Urine Nitrite     (Negative)  


 


Urine Bilirubin     (Negative)  


 


Urine Urobilinogen     (<2.0)  mg/dL


 


Ur Leukocyte Esterase     (Negative)  


 


Urine WBC (Auto)     (0.0-6.0)  /HPF


 


Urine RBC (Auto)     (0.0-6.0)  /HPF


 


U Epithel Cells (Auto)     (0-13.0)  /HPF


 


Urine Mucus     /HPF














  01/20/20 Range/Units





  Unknown 


 


WBC   (4.5-11.0)  K/mm3


 


RBC   (3.65-5.03)  M/mm3


 


Hgb   (11.8-15.2)  gm/dl


 


Hct   (35.5-45.6)  %


 


MCV   (84-94)  fl


 


MCH   (28-32)  pg


 


MCHC   (32-34)  %


 


RDW   (13.2-15.2)  %


 


Plt Count   (140-440)  K/mm3


 


Lymph % (Auto)   (13.4-35.0)  %


 


Mono % (Auto)   (0.0-7.3)  %


 


Eos % (Auto)   (0.0-4.3)  %


 


Baso % (Auto)   (0.0-1.8)  %


 


Lymph #   (1.2-5.4)  K/mm3


 


Mono #   (0.0-0.8)  K/mm3


 


Eos #   (0.0-0.4)  K/mm3


 


Baso #   (0.0-0.1)  K/mm3


 


Seg Neutrophils %   (40.0-70.0)  %


 


Seg Neutrophils #   (1.8-7.7)  K/mm3


 


Sodium   (137-145)  mmol/L


 


Potassium   (3.6-5.0)  mmol/L


 


Chloride   ()  mmol/L


 


Carbon Dioxide   (22-30)  mmol/L


 


Anion Gap   mmol/L


 


BUN   (9-20)  mg/dL


 


Creatinine   (0.8-1.5)  mg/dL


 


Estimated GFR   ml/min


 


BUN/Creatinine Ratio   %


 


Glucose   ()  mg/dL


 


Calcium   (8.4-10.2)  mg/dL


 


Total Bilirubin   (0.1-1.2)  mg/dL


 


AST   (5-40)  units/L


 


ALT   (7-56)  units/L


 


Alkaline Phosphatase   ()  units/L


 


Total Protein   (6.3-8.2)  g/dL


 


Albumin   (3.9-5)  g/dL


 


Albumin/Globulin Ratio   %


 


Lipase   (13-60)  units/L


 


Urine Color  Yellow  (Yellow)  


 


Urine Turbidity  Clear  (Clear)  


 


Urine pH  6.0  (5.0-7.0)  


 


Ur Specific Gravity  1.027  (1.003-1.030)  


 


Urine Protein  30 mg/dl  (Negative)  mg/dL


 


Urine Glucose (UA)  Neg  (Negative)  mg/dL


 


Urine Ketones  Neg  (Negative)  mg/dL


 


Urine Blood  Sm  (Negative)  


 


Urine Nitrite  Neg  (Negative)  


 


Urine Bilirubin  Neg  (Negative)  


 


Urine Urobilinogen  < 2.0  (<2.0)  mg/dL


 


Ur Leukocyte Esterase  Neg  (Negative)  


 


Urine WBC (Auto)  1.0  (0.0-6.0)  /HPF


 


Urine RBC (Auto)  3.0  (0.0-6.0)  /HPF


 


U Epithel Cells (Auto)  < 1.0  (0-13.0)  /HPF


 


Urine Mucus  2+  /HPF














- Medical Decision Making





Patient is nontoxic in no acute distress.  Tolerating by mouth fluids well.  

Bowel sounds are normal with no distention and tolerate by mouth fluids making 

small bowel obstruction unlikely.  Patient had epigastric tenderness but states 

he has not been drinking alcohol for the past many weeks and had a normal lipase

 making acute pancreatitis unlikely.  Patient had normal LFTs and a negative 

Moran sign making acute cholecystitis or choledocholithiasis unlikely.  He had 

normal kidney function, no hematuria making nephrolithiasis unlikely.  There is 

no pyuria or CVA tenderness making pyelonephritis unlikely.  He had no 

tenderness over McBurney's point with elevation of his white blood cell count 

fever or vomiting making acute appendicitis unlikely.  He had a little Christopher 

score.  He does report a history of peptic ulcers and states this felt similar. 

 He was given a GI cocktail including Mylanta and lidocaine and reported with 

temporary relief of his symptoms.  I do plan on sending him home with Carafate 

and omeprazole and recommended GI follow-up.  He had no symptoms of a bleeding 

ulcer such as hematemesis or melena recommended he return to emergency 

department immediately if he develops any of the symptoms.  He is 

hemodynamically stable and in no acute distress currently sleeping in the bed 

and we'll discharge him in stable condition.  He verbalized understanding of the

 diagnosis, treatment plan and follow-up instructions and all his questions were

 answered.





- Differential Diagnosis


peptic ulcer disease, pancreatitis, appendicitis





ED Disposition


Clinical Impression: 


 Dyspepsia, Epigastric abdominal pain





Diarrhea


Qualifiers:


 Diarrhea type: unspecified type Qualified Code(s): R19.7 - Diarrhea, 

unspecified





Disposition: DC-01 TO HOME OR SELFCARE


Is pt being admited?: No


Condition: Stable


Instructions:  Peptic Ulcer (ED)


Prescriptions: 


Sucralfate [Carafate] 1 gm PO ACHS #90 tablet


Omeprazole 20 mg PO DAILY #30 tablet.


Referrals: 


PRIMARY CARE,MD [Primary Care Provider] - 3-5 Days


[facility] [Provider Group] - 3-5 Days


Forms:  Work/School Release Form(ED)


Time of Disposition: 09:15





<HILARIA WALSH - Last Filed: 01/21/20 14:08>





ED Review of Systems


ROS: 


Stated complaint: ABD PAIN/DIARRHEA


Other details as noted in HPI








ED Course


                                   Vital Signs











  01/20/20 01/20/20





  05:13 09:31


 


Temperature 97.7 F 


 


Pulse Rate 80 91 H


 


Respiratory 18 18





Rate  


 


Blood Pressure 153/102 


 


Blood Pressure  146/96





[Left]  


 


O2 Sat by Pulse 97 99





Oximetry  














ED Medical Decision Making





- Lab Data


Result diagrams: 


                                 01/20/20 05:33





                                 01/20/20 05:33


Critical care attestation.: 


If time is entered above; I have spent that time in minutes in the direct care 

of this critically ill patient, excluding procedure time.








ED Disposition


Is pt being admited?: No


Does the pt Need Aspirin: No

## 2021-02-19 ENCOUNTER — HOSPITAL ENCOUNTER (EMERGENCY)
Dept: HOSPITAL 5 - ED | Age: 56
Discharge: HOME | End: 2021-02-19
Payer: SELF-PAY

## 2021-02-19 VITALS — SYSTOLIC BLOOD PRESSURE: 151 MMHG | DIASTOLIC BLOOD PRESSURE: 97 MMHG

## 2021-02-19 DIAGNOSIS — Z72.0: ICD-10-CM

## 2021-02-19 DIAGNOSIS — I10: ICD-10-CM

## 2021-02-19 DIAGNOSIS — K21.9: ICD-10-CM

## 2021-02-19 DIAGNOSIS — Z79.899: ICD-10-CM

## 2021-02-19 DIAGNOSIS — Z79.82: ICD-10-CM

## 2021-02-19 DIAGNOSIS — J45.901: Primary | ICD-10-CM

## 2021-02-19 PROCEDURE — 99283 EMERGENCY DEPT VISIT LOW MDM: CPT

## 2021-02-19 PROCEDURE — 71045 X-RAY EXAM CHEST 1 VIEW: CPT

## 2021-02-19 NOTE — XRAY REPORT
CHEST 1 VIEW 2/19/2021 4:24 PM



INDICATION / CLINICAL INFORMATION: SOB.



COMPARISON: 2/16/2017



FINDINGS:



SUPPORT DEVICES: None.

HEART / MEDIASTINUM: No significant abnormality. 

LUNGS / PLEURA: No significant pulmonary or pleural abnormality. No pneumothorax. 



ADDITIONAL FINDINGS: No significant additional findings.



IMPRESSION:

1. No acute findings.



Signer Name: Joo Flowers MD 

Signed: 2/19/2021 5:26 PM

Workstation Name: [id number]

## 2021-02-19 NOTE — EMERGENCY DEPARTMENT REPORT
HPI





- General


Chief Complaint: Adult Asthma


Time Seen by Provider: 02/19/21 16:46





- HPI


HPI: 





This is a 55-year-old -American male presents to the emergency department

with complaint of a 1 week history of shortness of breath, wheezing, and an 

occasional dry cough.  More recently the patient feels that he is developing a 

"cold in the chest" and when he is developing some mucus.  He denies any fever, 

chest pain, lower extremity swelling, nausea, vomiting, diaphoresis.  The 

patient is a tobacco smoker but denies any illicit drug use.  Symptoms worsen 

with exertion.  No known alleviating factors.  He has used his albuterol inhaler

and nebulizer treatments at home with only transient relief.  He does have a 

past medical history of asthma and says that he is having "an asthma attack."  

He also has a past medical history of hypertension and GERD.  He follows with 

[facility] but has not seen them regarding the symptoms.  No 

recent travel or sick contacts at home.  No known exposure to anyone with COVID-

19.





ED Past Medical Hx





- Past Medical History


Previous Medical History?: Yes


Hx Hypertension: Yes


Hx GERD: Yes


Hx Asthma: Yes


Additional medical history: Ulcers





- Surgical History


Past Surgical History?: Yes


Additional Surgical History: RIGHT EYE SURGERY





- Social History


Smoking Status: Current Every Day Smoker


Substance Use Type: None





- Medications


Home Medications: 


                                Home Medications











 Medication  Instructions  Recorded  Confirmed  Last Taken  Type


 


Pantoprazole [Protonix] 40 mg PO QDAY #30 tablet 01/19/16  Unknown Rx


 


lisinopriL [Zestril TAB] 20 mg PO QDAY #30 tablet 01/19/16  Unknown Rx


 


Aspirin 325 mg PO QDAY #30 tablet 02/17/17  Unknown Rx


 


Ipratropium [Atrovent NEB] 0.5 mg IH Q6HRT #1 box 02/17/17  Unknown Rx


 


Simvastatin [Zocor TAB] 5 mg PO QHS #30 tablet 02/17/17  Unknown Rx


 


Cyclobenzaprine [Flexeril] 10 mg PO QHS PRN #10 tablet 06/13/19  Unknown Rx


 


Naproxen [Naprosyn] 500 mg PO BID #30 tablet 06/13/19  Unknown Rx


 


amLODIPine 10 mg PO DAILY #30 tab 06/13/19  Unknown Rx


 


Cyclobenzaprine [Flexeril] 10 mg PO QHS PRN #10 tablet 11/10/19  Unknown Rx


 


Naproxen 500 mg PO Q12H PRN #10 tablet 11/10/19  Unknown Rx


 


Omeprazole 20 mg PO DAILY #30 tablet. 01/20/20  Unknown Rx


 


Sucralfate [Carafate] 1 gm PO ACHS #90 tablet 01/20/20  Unknown Rx


 


ALBUTEROL NEB's [Proventil 0.083% 2.5 mg IH Q6H PRN #1 box 02/19/21  Unknown Rx





NEBS]     


 


Albuterol Mdi (or & Nicu Only) 2 puff IH QID PRN #8.5 gram 02/19/21  Unknown Rx





[ProAir HFA Inhaler]     


 


predniSONE [Deltasone] 20 mg PO BID #8 tab 02/19/21  Unknown Rx














ED Review of Systems


ROS: 


Stated complaint: ASTHMA/WHEZZING


Other details as noted in HPI





Comment: All other systems reviewed and negative


Constitutional: denies: chills, fever


Eyes: denies: eye pain, vision change


ENT: denies: ear pain, throat pain


Respiratory: cough, shortness of breath, wheezing


Cardiovascular: denies: chest pain, edema


Gastrointestinal: denies: abdominal pain, vomiting


Genitourinary: denies: dysuria, discharge


Musculoskeletal: denies: back pain, arthralgia


Skin: denies: rash, lesions


Neurological: denies: headache, weakness





Physical Exam





- Physical Exam


Vital Signs: 


                                   Vital Signs











  02/19/21 02/19/21 02/19/21





  16:38 16:47 16:48


 


Temperature 98.1 F  


 


Pulse Rate 90  


 


Respiratory 24  20





Rate   


 


Blood Pressure 149/91  


 


O2 Sat by Pulse 97 90 94





Oximetry   














  02/19/21





  16:49


 


Temperature 


 


Pulse Rate 86


 


Respiratory 20





Rate 


 


Blood Pressure 


 


O2 Sat by Pulse 95





Oximetry 











Physical Exam: 





GENERAL: The patient is well-developed well-nourished.


HENT: Normocephalic.  Atraumatic.    Patient has moist mucous membranes.


EYES: Extraocular motions are intact.


NECK: Supple. Trachea is midline.


CHEST/LUNGS: Moderate wheezing throughout the chest.  Mild tachypnea but no 

accessory muscle use.  There is no respiratory distress noted.


HEART/CARDIOVASCULAR: Regular.  There is no tachycardia.  There is no murmur.


ABDOMEN: Abdomen is soft, nontender.  Patient has normal bowel sounds.  There is

 no abdominal distention.


SKIN: Skin is warm and dry. 


NEURO: The patient is awake, alert, and oriented.  The patient is cooperative.  

The patient has no focal neurologic deficits.  Normal speech.


MUSCULOSKELETAL: There is no tenderness or deformity.  There is no limitation 

range of motion.  





ED Course


                                   Vital Signs











  02/19/21 02/19/21 02/19/21





  16:38 16:47 16:48


 


Temperature 98.1 F  


 


Pulse Rate 90  


 


Respiratory 24  20





Rate   


 


Blood Pressure 149/91  


 


O2 Sat by Pulse 97 90 94





Oximetry   














  02/19/21





  16:49


 


Temperature 


 


Pulse Rate 86


 


Respiratory 20





Rate 


 


Blood Pressure 


 


O2 Sat by Pulse 95





Oximetry 














- Reevaluation(s)


Reevaluation #1: 





02/19/21 18:13


The patient was reevaluated and lungs were auscultated after completing the 

breathing treatments.  The wheezing has completely resolved and the patient says

 that he is feeling greatly improved.





ED Medical Decision Making





- Radiology Data


Radiology results: image reviewed


interpreted by me: 





Chest x-ray does not show any acute process.  There are no pleural effusions, 

obvious pneumonia and there is no pneumothorax. No significant cardiomegaly.





- Medical Decision Making





This patient presents with a 1 week history of some shortness of breath, 

wheezing and coughing that he feels is an asthma exacerbation.  On examination 

the patient does have moderate wheezing/bronchospasm but does not appear in any 

respiratory or acute distress.  He was given a dose of prednisone and an 

extended breathing treatment with both albuterol and Atrovent.  Upon 

reevaluation the wheezing has completely resolved and the patient admits that he

 is feeling greatly improved.  His chest x-ray did not show any pneumonia, p

leural effusions, pneumothorax, or any other acute process.  Vital signs have 

been reassuring throughout his ED course including being afebrile.  For all 

these reasons patient appears safe for discharge home at this time.  We 

discussed smoking cessation.  Patient has been instructed to follow-up with 

primary care and will return to the emergency department with any worsening of 

his symptoms or with any acute distress.


Critical Care Time: No


Critical care attestation.: 


If time is entered above; I have spent that time in minutes in the direct care 

of this critically ill patient, excluding procedure time.








ED Disposition


Clinical Impression: 


 Bronchospasm, Tobacco use





Asthma exacerbation


Qualifiers:


 Asthma severity: unspecified severity Asthma persistence: unspecified Qualified

 Code(s): J45.901 - Unspecified asthma with (acute) exacerbation





Disposition: DC-01 TO HOME OR SELFCARE


Is pt being admited?: No


Condition: Stable


Instructions:  Asthma, Adult, Health Risks of Smoking, Steps to Quit Smoking


Additional Instructions: 


Please follow-up with your primary care physician in the next few days.





Please quit smoking.





Take your medications as prescribed.





Return to the emergency department with any worsening of your symptoms, new or 

concerning symptoms not addressed during this current emergency department 

visit, or with any acute distress.


Prescriptions: 


predniSONE [Deltasone] 20 mg PO BID #8 tab


Albuterol Mdi (or & Nicu Only) [ProAir HFA Inhaler] 2 puff IH QID PRN #8.5 gram


 PRN Reason: Shortness Of Breath


ALBUTEROL NEB's [Proventil 0.083% NEBS] 2.5 mg IH Q6H PRN #1 box


 PRN Reason: Wheezing


Referrals: 


PRIMARY CARE,MD [Primary Care Provider] - 3-5 Days


Time of Disposition: 18:16

## 2022-01-27 ENCOUNTER — HOSPITAL ENCOUNTER (OUTPATIENT)
Dept: HOSPITAL 5 - XRAY | Age: 57
Discharge: HOME | End: 2022-01-27
Attending: INTERNAL MEDICINE
Payer: COMMERCIAL

## 2022-01-27 DIAGNOSIS — M19.011: Primary | ICD-10-CM

## 2022-01-27 NOTE — XRAY REPORT
RIGHT SHOULDER 3 VIEWS



INDICATION:  RIGHT SHOULDER PAIN. 



COMPARISON: None.



IMPRESSION:  No acute osseous or soft tissue abnormality.    Mild osteoarthritic changes are identifi
ed at the glenohumeral joint.



Signer Name: Anuj Magana Jr, MD 

Signed: 1/27/2022 2:28 PM

Workstation Name: [id number]